# Patient Record
Sex: MALE | Race: BLACK OR AFRICAN AMERICAN
[De-identification: names, ages, dates, MRNs, and addresses within clinical notes are randomized per-mention and may not be internally consistent; named-entity substitution may affect disease eponyms.]

---

## 2023-07-08 ENCOUNTER — HOSPITAL ENCOUNTER (EMERGENCY)
Dept: HOSPITAL 97 - ER | Age: 48
LOS: 1 days | Discharge: HOME | End: 2023-07-09
Payer: OTHER GOVERNMENT

## 2023-07-08 DIAGNOSIS — R11.2: Primary | ICD-10-CM

## 2023-07-08 DIAGNOSIS — T43.8X5A: ICD-10-CM

## 2023-07-08 LAB
ALBUMIN SERPL BCP-MCNC: 3.5 G/DL (ref 3.4–5)
ALP SERPL-CCNC: 95 U/L (ref 45–117)
ALT SERPL W P-5'-P-CCNC: 79 U/L (ref 16–61)
AST SERPL W P-5'-P-CCNC: 84 U/L (ref 15–37)
BILIRUB INDIRECT SERPL-MCNC: (no result) MG/DL (ref 0.2–0.8)
BUN BLD-MCNC: 17 MG/DL (ref 7–18)
GLUCOSE SERPLBLD-MCNC: 151 MG/DL (ref 74–106)
HCT VFR BLD CALC: 40.9 % (ref 39.6–49)
INR BLD: 0.98
LYMPHOCYTES # SPEC AUTO: 1.3 K/UL (ref 0.7–4.9)
MCV RBC: 88.1 FL (ref 80–100)
PMV BLD: 7.6 FL (ref 7.6–11.3)
POTASSIUM SERPL-SCNC: 3.6 MEQ/L (ref 3.5–5.1)
RBC # BLD: 4.64 M/UL (ref 4.33–5.43)

## 2023-07-08 PROCEDURE — 80076 HEPATIC FUNCTION PANEL: CPT

## 2023-07-08 PROCEDURE — 85730 THROMBOPLASTIN TIME PARTIAL: CPT

## 2023-07-08 PROCEDURE — 81001 URINALYSIS AUTO W/SCOPE: CPT

## 2023-07-08 PROCEDURE — 80048 BASIC METABOLIC PNL TOTAL CA: CPT

## 2023-07-08 PROCEDURE — 82077 ASSAY SPEC XCP UR&BREATH IA: CPT

## 2023-07-08 PROCEDURE — 70450 CT HEAD/BRAIN W/O DYE: CPT

## 2023-07-08 PROCEDURE — 85610 PROTHROMBIN TIME: CPT

## 2023-07-08 PROCEDURE — 93005 ELECTROCARDIOGRAM TRACING: CPT

## 2023-07-08 PROCEDURE — 80143 DRUG ASSAY ACETAMINOPHEN: CPT

## 2023-07-08 PROCEDURE — 36415 COLL VENOUS BLD VENIPUNCTURE: CPT

## 2023-07-08 PROCEDURE — 96374 THER/PROPH/DIAG INJ IV PUSH: CPT

## 2023-07-08 PROCEDURE — 96375 TX/PRO/DX INJ NEW DRUG ADDON: CPT

## 2023-07-08 PROCEDURE — 85025 COMPLETE CBC W/AUTO DIFF WBC: CPT

## 2023-07-08 PROCEDURE — 99284 EMERGENCY DEPT VISIT MOD MDM: CPT

## 2023-07-08 PROCEDURE — 76705 ECHO EXAM OF ABDOMEN: CPT

## 2023-07-08 PROCEDURE — 80307 DRUG TEST PRSMV CHEM ANLYZR: CPT

## 2023-07-08 PROCEDURE — 80179 DRUG ASSAY SALICYLATE: CPT

## 2023-07-08 NOTE — XMS REPORT
Continuity of Care Document

                             Created on:2023



Patient:JOBY MENDOZA

Sex:Male

:1975

External Reference #:[id number]





Demographics







                          Address                   [address] [place] [address]

 

                          Home Phone                [phone]

 

                          Mobile Phone              [phone]

 

                          Email Address             [email or web address]

 

                          Preferred Language        English

 

                          Marital Status            Unknown

 

                          Restoration Affiliation     Unknown

 

                          Race                      Unknown

 

                          Additional Race(s)        Unavailable

 

                          Ethnic Group              Unknown









Author







                          Organization              [facility]

 

                          Address                   [address] [place] [address]

 

                          Phone                     [phone]









Support







                Name            Relationship    Address         Phone

 

                SHELLI MENDOZA               Unavailable     [phone]









Care Team Providers







                    Name                Role                Phone

 

                    [place], [facility] RONNIE [facility] Primary Care Physician 

Unavailable

 

                    Mariluz Wells MD Attending Clinician [phone]

 

                    MARILUZ WELLS Attending Clinician Unavailable

 

                    MARILUZ WELLS Attending Clinician Unavailable

 

                    Doctor Unassigned, No Name Attending Clinician Unavailable

 

                    1, [facility] Sleep Lab Bed Attending Clinician Unavailable

 

                    Only, Adc Test      Attending Clinician Unavailable

 

                    Tristian Lmea MD Attending Clinician [phone]

 

                    TRISTIAN LEMA   Attending Clinician Unavailable









Payers







           Payer Name Policy Type Policy Number Effective Date Expiration Date S

ource







Problems







       Condition Condition Condition Status Onset  Resolution Last   Treating Co

mments 

Source



       Name   Details Category        Date   Date   Treatment Clinician        



                                                 Date                 

 

       No known No known Disease                                           Unive

rs



       active active                                                  ity of



       problems problems                                                  [facility]







Allergies, Adverse Reactions, Alerts







       Allergy Allergy Status Severity Reaction(s) Onset  Inactive Treating Comm

ents 

Source



       Name   Type                        Date   Date   Clinician        

 

       NO KNOWN Drug   Active                                           [facility]



       ALLERGIE Class                                                   ity of



       S                                                              [facility]







Social History







           Social Habit Start Date Stop Date  Quantity   Comments   Source

 

           Exposure to                       Not sure              [facility]

 [place]



           SARS-CoV-2 (event)                                             [facility]

l Branch

 

           Tobacco use and 2022 Never used            [facility]



           exposure   00:00:00   00:00:00                         [facility]

 

           Sex Assigned At 1975                       [facility]



           Birth      00:00:00   00:00:00                         [facility] [place]









                Smoking Status  Start Date      Stop Date       Source

 

                Never smoker                                    [facility]







Medications







       Ordered Filled Start  Stop   Current Ordering Indication Dosage Frequency

 Signature

                    Comments            Components          Source



     Medication Medication Date Date Medication? Clinician                (SIG) 

          



     Name Name                                                   

 

     sildenafil            Yes                      TAKE 5           Unive

rs



     20 mg      1-14                               TABLETS           ity of



     tablet      00:00:                               (100 MG)           [place]



               00                                 BY MOUTH           Medical



                                                  ONCE DAILY           Branch



                                                  AS NEEDED           



                                                  1 HOUR           



                                                  BEFORE SEX           



                                                  (MAX 5           



                                                  TABLETS/DA           



                                                  Y)             

 

     sildenafil            Yes                      TAKE 5           Unive

rs



     20 mg      1-14                               TABLETS           ity of



     tablet      00:00:                               (100 MG)           [place]



               00                                 BY MOUTH           Medical



                                                  ONCE DAILY           Branch



                                                  AS NEEDED           



                                                  1 HOUR           



                                                  BEFORE SEX           



                                                  (MAX 5           



                                                  TABLETS/DA           



                                                  Y)             

 

     atorvastati            Yes                      TAKE           Univer

s



     n 20 mg      1-03                               ONE-HALF           ity of



     tablet      00:00:                               TABLET BY           [place]



               00                                 MOUTH           Medical



                                                  DAILY FOR           Branch



                                                  CHOLESTERO           



                                                  L              

 

     lisinopriL            Yes                      TAKE ONE           Uni

vers



     5 mg tablet      1-03                               TABLET BY           ity

 of



               00:00:                               MOUTH           [place]



                                                DAILY FOR           Medical



                                                  HEART/           Branch



                                                  BLOOD           



                                                  PRESSURE           

 

     atorvastati            Yes                      TAKE           Univer

s



     n 20 mg      1-03                               ONE-HALF           ity of



     tablet      00:00:                               TABLET BY           [place]



               00                                 MOUTH           Medical



                                                  DAILY FOR           Branch



                                                  CHOLESTERO           



                                                  L              

 

     lisinopriL            Yes                      TAKE ONE           Uni

vers



     5 mg tablet      1-03                               TABLET BY           ity

 of



               00:00:                               MOUTH           [place]



                                                DAILY FOR           Medical



                                                  HEART/           Branch



                                                  BLOOD           



                                                  PRESSURE           







Vital Signs







             Vital Name   Observation Time Observation Value Comments     Source

 

             Systolic blood 2022 19:14:00 138 mm[Hg]                [facility]

sity of



             pressure                                            [facility]

 

             Diastolic blood 2022 19:14:00 92 mm[Hg]                 Unive

rsity of



             pressure                                            [facility]

 

             Heart rate   2022 19:14:00 91 /min                   [facility]

 

             Respiratory rate 2022 19:12:00 19 /min                   Univ

ersity of



                                                                 [facility]

 

             Body height  2022 19:12:00 177.8 cm                  [facility]

 

             Body weight  2022 19:12:00 115.214 kg                [facility]

 

             BMI          2022 19:12:00 36.45 kg/m2               [facility]

 

             Oxygen saturation in 2022 19:12:00 96 /min                   

[facility] blood by                                        [place] [facility]

carlos



             Pulse oximetry                                        Branch







Procedures







                Procedure       Date / Time     Performing Clinician Source



                                Performed                       

 

                DME/SUPPLY JUSTIFICATION 2022 06:01:00 Doctor Sheilassjimenez, 

No [facility]







Encounters







        Start   End     Encounter Admission Attending Care    Care    Encounter 

Source



        Date/Time Date/Time Type    Type    Clinicians Facility Department ID   

   

 

        2022 Office          Priscilla [facility]    [id number]

[id number] [facility]



        13:00:00 13:25:40 Visit           Mariluz GRULLON 350.1.13.10        

 ity [facility] [id number]         [facility] [phone]         Me

dical



                                                [address]                 

 

        2022 Outpatient R       MARILUZ WELLS [facility] 

   [id number] 

[facility]



        13:00:00 13:25:40                 MARILUZ WELLS                     

    itdoron [facility]

 

        2022 Outpatient R       ELYSIA WELLSHIGEOVANNA [facility] 

   [id number] 

[facility]



        13:00:00 13:00:00                 MARILUZ WELLS [facility]

 

        2022 Orders          Doctor BENNETT    [id number]

[id number] Univers



        00:00:00 00:00:00 Only            Unassigned, JOSE   350.1.13.10       

  ity of



                                        [facility] [id number]         Pa

as



                                                        [phone]         Medi

carlos



                                                        009             Branch

 

        2022 Technician         1, [facility] Sleep Lab Bed [facility]    1.

[id number]

                                        [facility]



        19:30:00 22:00:00 Visit           Mariluz Wells 350.1.13.

10         ity of



                                                [place] [id number]         [facility]  [phone]         Medi

carlos



                                                        193             Branch

 

        2022 Outpatient R       MARILUZ WELLS [facility] 

   [id number] 

Univers



        19:30:00 19:30:00                 MARILUZ WELLS                     

    itdoron [facility]

 

        2022 Outpatient R       ELYSIA WELLSHIGEOVANNA [facility] 

   [id number] 

Univers



        19:30:00 19:30:00                 ATAAMY ORTIZL                     

    ity [facility]

 

        2022 Orders          Doctor  DONALD    [id number]

[id number] Univers



        00:00:00 00:00:00 Only            Unassigned, JOSE   350.1.13.10       

  ity of



                                        [facility] [id number]         Pa

as



                                                        [phone]         Medi

carols



                                                        009             Branch

 

        2022 Laboratory         Only, Adc Test [facility]    [id number]

[id number] 

Univers



        07:45:00 08:00:00 Only            Pita, Tristian GRULLON 350.1.13.10

         ity of



                                                [place] [id number]         [place]

s



                                                [place]  [phone]         Medi

carlos



                                                        353             Branch

 

        2022 Outpatient R       PITA [facility]    [id number]

[id number] [facility]



        07:45:00 07:45:00                 TRISTIAN                         [facility]

 

        2022 Outpatient R       PRISCILLA AMYL [facility] 

   [id number] 

Univers



        09:20:00 09:50:01                 PRISCILLA ELYSIAHIL                     

    ity [facility]

 

        2022 Outpatient R       ELYSIA WELLSHIGEOVANNA [facility] 

   [id number] 

Univers



        09:20:00 09:50:01                 PRISCILLA ELYSIAHIL                     

    ity [facility]

 

        2022 [facility]          [facility]    [id number]

[id number] Univers



        09:20:00 09:40:00 Visit           Mariluz GRULLON 350.1.13.10        

 ity [facility] [id number]         [place]

s



                                                [facility] [phone]         [address]                 

 

        2022 Outpatient R       AMY WELLSL [facility] 

   [id number] 

Univers



        09:20:00 09:20:00                 PRISCILLA AMYL                     

    ity [facility]

 

        2018 [facility]                 [facility]    [id number]

[id number] [facility]



        00:00:00 00:00:00                                                 [facility]







Results

This patient has no known results.

## 2023-07-08 NOTE — RAD REPORT
EXAM DESCRIPTION:  CT - Head Brain Wo Cont - 7/8/2023 10:28 pm

 

CLINICAL HISTORY:  Alteration of awareness/confusion

 

COMPARISON:  None

 

TECHNIQUE:  Computed axial tomography of the head was obtained. IV contrast was not requested.

 

All CT scans are performed using dose optimization technique as appropriate and may include automated
 exposure control or mA/KV adjustment according to patient size.

 

FINDINGS:  An intracranial  bleed is not seen

 

The ventricles are normal in caliber

 

No extra-axial fluid collection is noted.

 

No significant hypodensity within the brain noted

 

Ethmoid, sphenoid and maxillary sinusitis

 

IMPRESSION:  No acute intracranial abnormality is seen

 

If patient's symptoms persist  MRI of the brain would be recommended

## 2023-07-09 VITALS — SYSTOLIC BLOOD PRESSURE: 119 MMHG | DIASTOLIC BLOOD PRESSURE: 88 MMHG

## 2023-07-09 VITALS — OXYGEN SATURATION: 100 %

## 2023-07-09 LAB
METHAMPHET UR QL SCN: NEGATIVE
THC SERPL-MCNC: POSITIVE NG/ML

## 2023-07-09 NOTE — EDPHYS
Physician Documentation                                                                           

 [facility]                                                                 

Name: Juan A Feldman                                                                                  

Age: 47 yrs                                                                                       

Sex: Male                                                                                         

: 1975                                                                                   

MRN: [id number]                                                                                   

Arrival Date: 2023                                                                          

Time: 21:05                                                                                       

Account#: [id number]                                                                            

Bed 4                                                                                             

Private MD:                                                                                       

ED Physician Jaden Braun                                                                       

HPI:                                                                                              

                                                                                             

21:40 This 47 yrs old  Male presents to ER via Ambulatory with complaints of Altered  cp  

      Mental Status.                                                                              

21:40 The patient presents with confusion, decreased responsiveness. Onset: The               cp  

      symptoms/episode began/occurred today. Possible causes: drug use, marijuana. Associated     

      signs and symptoms: Pertinent positives: nausea, vomiting, Pertinent negatives:             

      abdominal pain, chest pain, palpitations, seizure. Current symptoms: In the emergency       

      department the patient's symptoms have improved. Patient's baseline: Neuro: alert and       

      fully oriented, Motor: no deficits, Ambulation: walks without assistance, Speech:           

      normal. Patient brought to ED accompanied by girlfriend with concern for possible           

      overdose on THC candy.                                                                      

                                                                                                  

Historical:                                                                                       

- Allergies:                                                                                      

21:44 No Known Allergies;                                                                     vc1 

- Home Meds:                                                                                      

21:44 None [Active];                                                                          vc1 

- PMHx:                                                                                           

21:44 None;                                                                                   vc1 

- PSHx:                                                                                           

21:44 None;                                                                                   vc1 

                                                                                                  

- Immunization history:: Client reports having NOT received the Covid vaccine.                    

- Social history:: Smoking status: unknown.                                                       

                                                                                                  

                                                                                                  

ROS:                                                                                              

21:45 Constitutional: Negative for body aches, chills, fever, poor PO intake.                 cp  

21:45 Eyes: Negative for injury, pain, redness, and discharge.                                cp  

21:45 ENT: Negative for drainage from ear(s), ear pain, sore throat, difficulty swallowing,       

      difficulty handling secretions.                                                             

21:45 Cardiovascular: Negative for chest pain, palpitations.                                      

21:45 Respiratory: Negative for cough, shortness of breath, wheezing.                             

21:45 Abdomen/GI: Positive for nausea and vomiting, Negative for abdominal pain, diarrhea,        

      constipation.                                                                               

21:45 Neuro: Negative for altered mental status, headache, seizure activity, weakness.            

21:45 All other systems are negative.                                                             

                                                                                                  

Exam:                                                                                             

21:50 Constitutional: The patient appears in no acute distress, alert, awake,                 cp  

      non-diaphoretic, non-toxic, well developed, well nourished.                                 

21:50 Head/Face:  Normocephalic, atraumatic.                                                  cp  

21:50 Eyes: Periorbital structures: appear normal, Pupils: constricted, bilaterally,              

      Extraocular movements: intact throughout, Conjunctiva: normal, no exudate, no               

      injection, Sclera: no appreciated abnormality, Lids and lashes: appear normal,              

      bilaterally.                                                                                

21:50 ENT: External ear(s): are unremarkable, Ear canal(s): are normal, clear, TM's:              

      dullness, bilaterally, Nose: is normal, Mouth: Lips: moist, Oral mucosa: pink and           

      intact, moist, Posterior pharynx: is normal, airway is patent, no erythema, no exudate.     

21:50 Neck: ROM/movement: is normal, is supple, without pain, no range of motions limitations.    

21:50 Chest/axilla: Inspection: normal, Palpation: is normal, no crepitus, no tenderness.         

21:50 Cardiovascular: Rate: tachycardic, Rhythm: regular, Edema: is not appreciated, JVD: is      

      not appreciated.                                                                            

21:50 Respiratory: the patient does not display signs of respiratory distress,  Respirations:     

      normal, no use of accessory muscles, no retractions, labored breathing, is not present,     

      Breath sounds: are clear throughout, no decreased breath sounds, no stridor, no             

      wheezing.                                                                                   

21:50 Abdomen/GI: Inspection: Bowel sounds: active, all quadrants, Palpation: abdomen is soft     

      and non-tender, in all quadrants.                                                           

21:50 Back: pain, is absent, ROM is normal.                                                       

21:50 Neuro: Orientation: to person, place, situation, Mentation: able to follow commands,        

      slow to respond, Motor: moves all fours, strength is normal, Sensation: no obvious          

      gross deficits.                                                                             

22:13 ECG was reviewed by the Attending Physician.                                            cp  

                                                                                                  

Vital Signs:                                                                                      

21:41  / 102; Pulse 100; Resp 19; Pulse Ox 99% ; Weight 113.4 kg; Height 5 ft. 11 in. ; vc1 

      Pain 0/10;                                                                                  

22:51  / 92; Pulse 91; Resp 15; Pulse Ox 99% on R/A;                                    kl  

07/09                                                                                             

00:11  / 87; Pulse 81; Resp 19; Pulse Ox 98% on R/A;                                    jb4 

01:38  / 99; Pulse 80; Resp 15; Pulse Ox 100% on R/A;                                   jb4 

02:54  / 88; Pulse 78; Resp 16; Pulse Ox 100% on R/A;                                   jb4 

                                                                                             

21:41 Body Mass Index 34.87 (113.40 kg, 180.34 cm)                                            vc1 

                                                                                             

21:41 Pain Scale: Adult                                                                       vc1 

                                                                                                  

MDM:                                                                                              

                                                                                             

21:26 Patient medically screened.                                                               

                                                                                             

03:02 Data reviewed: vital signs, nurses notes, lab test result(s), EKG, radiologic studies,  cp  

      ultrasound.                                                                                 

03:02 Differential Diagnosis: CVA, electrolyte abnormality, alcohol intoxication, overdose,   cp  

      seizure, volume depletion. Consideration of Admission/Observation Escalation of care        

      including admission/observation considered. I considered the following discharge            

      prescriptions or medication management in the emergency department Medications were         

      administered in the Emergency Department. See MAR. Independent interpretation of the        

      following test(s) in the Emergency Department EKG: See my EKG interpretation above.         

      Counseling: I had a detailed discussion with the patient and/or guardian regarding: the     

      historical points, exam findings, and any diagnostic results supporting the                 

      discharge/admit diagnosis, lab results, radiology results, the need for outpatient          

      follow up, a family practitioner, to return to the emergency department if symptoms         

      worsen or persist or if there are any questions or concerns that arise at home.             

      Response to treatment: the patient's symptoms have markedly improved after treatment,       

      and as a result, I will discharge patient. ED course: VSS. Discussed results of today's     

      testing with elevated CRE of 1.8. Patient given 2 liters NS. Will discharge to home to      

      continue oral hydration and outpatient f/u.                                                 

                                                                                                  

                                                                                             

21:35 Order name: Acetaminophen; Complete Time: 00:11                                           

                                                                                             

01:51 Interpretation: Reviewed.                                                                 

                                                                                             

21:35 Order name: Basic Metabolic Panel; Complete Time: 00:11                                   

                                                                                             

00:11 Interpretation: Normal except: ; GLUC 151; CRE 1.80; GFR 46; CA 8.3.                

                                                                                             

21:35 Order name: CBC with Diff; Complete Time: 22:56                                           

                                                                                             

01:50 Interpretation: Normal except: WBC 11.30; HGB 13.5; STEVE% 81.2; LYM% 11.6; NEUT A 9.2.     

                                                                                             

21:35 Order name: ETOH Level; Complete Time: 00:11                                              

                                                                                             

01:51 Interpretation: Reviewed.                                                                 

                                                                                             

21:35 Order name: Hepatic Function; Complete Time: 00:11                                      cp  

                                                                                             

00:11 Interpretation: Normal except: AST 84; ALT 79; TP 8.3; GLOB 4.8; A/G 0.7.                 

                                                                                             

21:35 Order name: PT-INR; Complete Time: 22:56                                                cp  

                                                                                             

21:35 Order name: Ptt, Activated; Complete Time: 22:56                                          

                                                                                             

21:35 Order name: Salicylate; Complete Time: 22:56                                              

                                                                                             

01:51 Interpretation: Reviewed.                                                                 

                                                                                             

21:35 Order name: Urinalysis w/ reflexes; Complete Time: 02:59                                cp  

                                                                                             

21:35 Order name: Urine Drug Screen; Complete Time: 02:59                                     cp  

                                                                                             

21:35 Order name: CT Head Brain wo Cont; Complete Time: 22:56                                   

                                                                                             

00:12 Order name: US Abdomen Limited: gallbladder                                               

                                                                                             

21:35 Order name: EKG; Complete Time: 21:35                                                     

                                                                                             

21:35 Order name: EKG - Nurse/Tech; Complete Time: 22:28                                      cp  

                                                                                             

21:35 Order name: IV Saline Lock; Complete Time: 22:42                                        cp  

                                                                                             

21:35 Order name: Labs collected and sent; Complete Time: 22:42                                 

                                                                                             

00:12 Order name: NPO; Complete Time: 00:17                                                   cp  

                                                                                                  

EC/08                                                                                             

22:13 Rate is 85 beats/min. Rhythm is regular. PA interval is normal. QRS interval is normal. cp  

      QT interval is normal. T waves are Inverted in lead aVR. Interpreted by me. Reviewed by     

      me.                                                                                         

                                                                                                  

Administered Medications:                                                                         

22:00 Drug: Famotidine IVP 20 mg Route: IVP; Site: left antecubital;                          kl  

22:00 Drug: NS 0.9% IV 1000 ml Route: IV; Rate: 1 bolus; Site: left antecubital;              kl  

22:08 Drug: Ondansetron IVP 4 mg Route: IVP; Site: left antecubital;                            

                                                                                             

00:18 Drug: NS 0.9% IV 1000 ml Route: IV; Rate: 1 bolus; Site: left forearm;                  jb4 

                                                                                                  

                                                                                                  

Disposition:                                                                                      

03:42 Co-signature as Attending Physician, Jaden Braun MD I agree with the assessment and   kdr 

      plan of care.                                                                               

                                                                                                  

Disposition Summary:                                                                              

23 03:02                                                                                    

Discharge Ordered                                                                                 

      Location: Home                                                                          cp  

      Problem: new                                                                            cp  

      Symptoms: have improved                                                                 cp  

      Condition: Stable                                                                       cp  

      Diagnosis                                                                                   

        - Adverse effect of other psychotropic drugs                                          cp  

        - Other injury of unspecified kidney, initial encounter                               cp  

      Followup:                                                                               cp  

        - With: Private Physician                                                                  

        - When: 2 - 3 days                                                                         

        - Reason: Recheck today's complaints                                                       

      Discharge Instructions:                                                                     

        - Discharge Summary Sheet                                                             cp  

        - Acute Kidney Injury, Adult                                                          cp  

        - Illegal Drug Use Information, Adult                                                 cp  

      Forms:                                                                                      

        - Medication Reconciliation Form                                                      cp  

        - Thank You Letter                                                                    cp  

        - Antibiotic Education                                                                cp  

        - Prescription Opioid Use                                                             cp  

        - [email or web address]                                                 cp  

Signatures:                                                                                       

Dispatcher [employer]                           EDNeyda Padilla RN                     RN   Jaden Balbuena MD MD kdr Page, Corey, PA PA   cp                                                   

Wili Mercado, RN                       RN   jb4                                                  

Karin Fields RN                    RN   vc1                                                  

                                                                                                  

Corrections: (The following items were deleted from the chart)                                    

                                                                                             

21:45 21:44 PMHx: Unable to Obtain; vc1                                                       vc1 

22:54 21:35 Suicide Screening ([place]) ordered. cp                                          jb4 

                                                                                                  

**************************************************************************************************

## 2023-07-09 NOTE — ER
Nurse's Notes                                                                                     

 [facility]                                                                 

Name: Juan A Feldman                                                                                  

Age: 47 yrs                                                                                       

Sex: Male                                                                                         

: 1975                                                                                   

MRN: [id number]                                                                                   

Arrival Date: 2023                                                                          

Time: 21:05                                                                                       

Account#: [id number]                                                                            

Bed 4                                                                                             

Private MD:                                                                                       

Diagnosis: Adverse effect of other psychotropic drugs;Other injury of unspecified kidney, initial 

  encounter                                                                                       

                                                                                                  

Presentation:                                                                                     

                                                                                             

21:41 Chief complaint: Spouse and/or significant other states: "I was in the kitchen washing  vc1 

      dishes when I heard him start vomiting. It was straight liquid and I noticed something      

      in his mouth. I pulled it out and he said it was his "wake up call" he's hidden alcohol     

      and stuff like this before". Coronavirus screen: Vaccine status: Patient reports being      

      unvaccinated. Client denies travel out of the [place] in the last 14 days. At this time,       

      the client does not indicate any symptoms associated with coronavirus-19. Ebola Screen:     

      Patient negative for fever greater than or equal to 101.5 degrees Fahrenheit, and           

      additional compatible Ebola Virus Disease symptoms Patient denies exposure to               

      infectious person. Patient denies travel to an Ebola-affected area in the 21 days           

      before illness onset. No symptoms or risks identified at this time. Initial Sepsis          

      Screen: Does the patient meet any 2 criteria? No. Patient's initial sepsis screen is        

      negative. Does the patient have a suspected source of infection? No. Patient's initial      

      sepsis screen is negative. Risk Assessment: Do you want to hurt yourself or someone         

      else? Patient reports no desire to harm self or others. Onset of symptoms was 2023 at 20:00.                                                                              

21:41 Method Of Arrival: Ambulatory                                                           vc1 

21:41 Acuity: ROSA 2                                                                           vc1 

                                                                                                  

Triage Assessment:                                                                                

21:46 General: Appears in no apparent distress. comfortable, Behavior is flat. Pain: Denies   vc1 

      pain. EENT: No deficits noted. No signs and/or symptoms were reported regarding the         

      EENT system. Neuro: Johnson Agitation-Sedation Scale (RASS): 0 - Alert and Calm Level      

      of Consciousness is awake, confused, Oriented to person, situation. Cardiovascular: No      

      deficits noted. Respiratory: Airway is patent Respiratory effort is even, unlabored,        

      Respiratory pattern is regular, symmetrical. GI: No deficits noted. No signs and/or         

      symptoms were reported involving the gastrointestinal system. : No deficits noted. No     

      signs and/or symptoms were reported regarding the genitourinary system. Derm: No            

      deficits noted. No signs and/or symptoms reported regarding the dermatologic system.        

      Musculoskeletal: Reports weakness in right leg and left leg.                                

                                                                                                  

Historical:                                                                                       

- Allergies:                                                                                      

21:44 No Known Allergies;                                                                     vc1 

- Home Meds:                                                                                      

21:44 None [Active];                                                                          vc1 

- PMHx:                                                                                           

21:44 None;                                                                                   vc1 

- PSHx:                                                                                           

21:44 None;                                                                                   vc1 

                                                                                                  

- Immunization history:: Client reports having NOT received the Covid vaccine.                    

- Social history:: Smoking status: unknown.                                                       

                                                                                                  

                                                                                                  

Screenin:46 Abuse screen: Denies threats or abuse. Nutritional screening: No deficits noted.        vc1 

      Tuberculosis screening: No symptoms or risk factors identified.                             

                                                                                                  

Assessment:                                                                                       

21:25 Reassessment: Pt in lobby states he is unable to use his legs. He walked in the ER and  vc1 

      sat down but now feels like he cannot stand or walk.                                        

21:48 Reassessment: Poison control case #[id number] Check electrolytes, EKG, place on cardiac   vc1 

      monitor, and hydrate.                                                                       

22:51 Reassessment: Patient appears in no apparent distress at this time. Patient states      kl  

      symptoms have improved. pt sleeping respirations even non labored.                          

                                                                                             

00:11 Reassessment: Patient appears in no apparent distress at this time. Patient and/or      jb4 

      family updated on plan of care and expected duration. Pain level reassessed. Patient is     

      alert, oriented x 3, equal unlabored respirations, skin warm/dry/pink.                      

01:37 Reassessment: Patient appears in no apparent distress at this time. Patient and/or      jb4 

      family updated on plan of care and expected duration. Pain level reassessed. Patient is     

      alert, oriented x 3, equal unlabored respirations, skin warm/dry/pink. Provider at the      

      bedside.                                                                                    

02:54 Reassessment: Patient appears in no apparent distress at this time. Patient and/or      jb4 

      family updated on plan of care and expected duration. Pain level reassessed. Patient is     

      alert, oriented x 3, equal unlabored respirations, skin warm/dry/pink.                      

                                                                                                  

Vital Signs:                                                                                      

                                                                                             

21:41  / 102; Pulse 100; Resp 19; Pulse Ox 99% ; Weight 113.4 kg; Height 5 ft. 11 in. ; vc1 

      Pain 0/10;                                                                                  

22:51  / 92; Pulse 91; Resp 15; Pulse Ox 99% on R/A;                                    kl  

                                                                                             

00:11  / 87; Pulse 81; Resp 19; Pulse Ox 98% on R/A;                                    jb4 

01:38  / 99; Pulse 80; Resp 15; Pulse Ox 100% on R/A;                                   jb4 

02:54  / 88; Pulse 78; Resp 16; Pulse Ox 100% on R/A;                                   jb4 

                                                                                             

21:41 Body Mass Index 34.87 (113.40 kg, 180.34 cm)                                            vc1 

                                                                                             

21:41 Pain Scale: Adult                                                                       vc1 

                                                                                                  

ED Course:                                                                                        

                                                                                             

21:08 Patient arrived in ED.                                                                  jj6 

21:15 Jaime Silva PA is PHCP.                                                                cp  

21:15 Jaden Braun MD is Attending Physician.                                              cp  

21:40 Wili Mercado, RN is Primary Nurse.                                                     jb4 

21:44 Triage completed.                                                                       vc1 

21:45 Arm band placed on left wrist.                                                          vc1 

21:46 Patient has correct armband on for positive identification. Bed in low position. Call   vc1 

      light in reach. Side rails up X2. Adult w/ patient. Client placed on continuous cardiac     

      and pulse oximetry monitoring. NIBP monitoring applied.                                     

22:12 EKG done, by ED staff, reviewed by Jaime GARCIA.                                       wm  

22:29 CT Head Brain wo Cont In Process Unspecified.                                           EDMS

                                                                                             

01:08 US Abdomen Limited: gallbladder In Process Unspecified.                                 EDMS

03:12 No provider procedures requiring assistance completed. IV discontinued, intact,         jb4 

      bleeding controlled, No redness/swelling at site. Pressure dressing applied.                

                                                                                                  

Administered Medications:                                                                         

                                                                                             

22:00 Drug: Famotidine IVP 20 mg Route: IVP; Site: left antecubital;                          kl  

22:00 Drug: NS 0.9% IV 1000 ml Route: IV; Rate: 1 bolus; Site: left antecubital;              kl  

22:08 Drug: Ondansetron IVP 4 mg Route: IVP; Site: left antecubital;                          kl  

                                                                                             

00:18 Drug: NS 0.9% IV 1000 ml Route: IV; Rate: 1 bolus; Site: left forearm;                  jb4 

                                                                                                  

                                                                                                  

Medication:                                                                                       

                                                                                             

21:48 VIS not applicable for this client.                                                     vc1 

                                                                                                  

Outcome:                                                                                          

                                                                                             

03:02 Discharge ordered by MD.                                                                cp  

03:12 Discharged to home via wheelchair, with family.                                         jb4 

03:12 Condition: stable                                                                           

03:12 Discharge instructions given to patient, Instructed on discharge instructions, follow       

      up and referral plans. Demonstrated understanding of instructions, follow-up care.          

03:17 Patient left the ED.                                                                    jb4 

                                                                                                  

Signatures:                                                                                       

Dispatcher [employer]                           EDNeyda Padilla RN                     RN   Jaime Mantilla PA PA cp Bryson, James, RN                       RN   jb4                                                  

Carli Morales Jennifer                           jj6                                                  

Karin Fields RN                    RN   vc1                                                  

                                                                                                  

Corrections: (The following items were deleted from the chart)                                    

                                                                                             

21:45 21:44 PMHx: Unable to Obtain; vc1                                                       vc1 

                                                                                                  

************************************************************************************************** Refill Approved    Rx renewed per Medication Renewal Policy. Medication was last renewed on 12/19/2019. Requested a different pharmacy, retransmitted. .    Sivan Westno, [facility] Triage/Med Refill 12/28/2019     Requested Prescriptions   Pending Prescriptions Disp Refills     ondansetron (ZOFRAN) 4 MG tablet 10 tablet 0     Sig: Take 1 tablet (4 mg total) by mouth every 4 (four) hours as needed.       There is no refill protocol information for this order

## 2023-07-10 NOTE — EKG
Test Date:    2023-07-08               Test Time:    22:07:35

Technician:                                        

                                                     

MEASUREMENT RESULTS:                                       

Intervals:                                           

Rate:         85                                     

AR:           148                                    

QRSD:         84                                     

QT:           398                                    

QTc:          473                                    

Axis:                                                

P:            52                                     

AR:           148                                    

QRS:          73                                     

T:            46                                     

                                                     

INTERPRETIVE STATEMENTS:                                       

                                                     

Normal sinus rhythm

Normal ECG

No previous ECG available for comparison



Electronically Signed On 07-10-23 17:11:39 CDT by Narayan Pretty

## 2023-07-10 NOTE — RAD REPORT
EXAM DESCRIPTION:  US - Abdomen Exam Limited - 7/9/2023 1:06 am

 

CLINICAL HISTORY:  47 years, Male, elevated liver enzymes

 

COMPARISON:  None.

 

TECHNIQUE:  Utilizing a curved array transducer, real-time ultrasound evaluation of the abdominal vis
cera was performed. Color Doppler imaging was used to assess vascular flow.

 

FINDINGS:  The liver suggesting minimal increased echogenicity.

The gallbladder demonstrate to be within normal limits.   No gallbladder stones were seen.   There is
 layering minimal echogenic material corresponding to most likely debris/sludge. No pericholecystic f
luid and or wall thickening was identified.   The common bile duct measures 3.1 mm.   No intra or ext
rahepatic biliary duct dilatation was identified.

No free fluid within the upper abdomen.

 

IMPRESSION:  Limited study. Probable sludge within the gallbladder.

 

Electronically signed by:   Albert James MD   7/9/2023 1:25 AM CDT Workstation: [id number]

 

Due to temporary technical issues with the PACS/Fluency reporting system, reports are being signed by
 the in house radiologist without review as a courtesy to ensure prompt reporting. The interpreting r
adiologist is fully responsible for the content of the report.